# Patient Record
Sex: FEMALE | Race: OTHER | HISPANIC OR LATINO
[De-identification: names, ages, dates, MRNs, and addresses within clinical notes are randomized per-mention and may not be internally consistent; named-entity substitution may affect disease eponyms.]

---
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
## 2022-03-29 ENCOUNTER — APPOINTMENT (OUTPATIENT)
Dept: FAMILY MEDICINE | Facility: CLINIC | Age: 22
End: 2022-03-29
Payer: MEDICAID

## 2022-03-29 VITALS
RESPIRATION RATE: 16 BRPM | DIASTOLIC BLOOD PRESSURE: 80 MMHG | SYSTOLIC BLOOD PRESSURE: 120 MMHG | BODY MASS INDEX: 42.8 KG/M2 | HEIGHT: 60 IN | OXYGEN SATURATION: 98 % | WEIGHT: 218 LBS | TEMPERATURE: 98.7 F | HEART RATE: 125 BPM

## 2022-03-29 DIAGNOSIS — Z28.3 UNDERIMMUNIZATION STATUS: ICD-10-CM

## 2022-03-29 DIAGNOSIS — Z02.89 ENCOUNTER FOR OTHER ADMINISTRATIVE EXAMINATIONS: ICD-10-CM

## 2022-03-29 PROCEDURE — 99213 OFFICE O/P EST LOW 20 MIN: CPT

## 2022-03-29 RX ORDER — QUETIAPINE FUMARATE 50 MG/1
50 TABLET ORAL DAILY
Qty: 30 | Refills: 3 | Status: COMPLETED | COMMUNITY
Start: 2020-11-09 | End: 2022-03-29

## 2022-03-29 NOTE — PHYSICAL EXAM
[Normal] : normal gait, coordination grossly intact, no focal deficits and deep tendon reflexes were 2+ and symmetric [de-identified] : Hyperpigmented skin around neck at the corners of the mouth

## 2022-03-29 NOTE — HISTORY OF PRESENT ILLNESS
[FreeTextEntry8] : 22 y/o female with PMHx significant for anemia, anxiety and depression, obesity presenting to the office c/o darkening skin at the corners of her mouth and around her neck. Pt has not used any OTC creams or ointments.

## 2022-03-29 NOTE — HEALTH RISK ASSESSMENT
[Never] : Never [No] : In the past 12 months have you used drugs other than those required for medical reasons? No [No falls in past year] : Patient reported no falls in the past year [0] : 2) Feeling down, depressed, or hopeless: Not at all (0) [PHQ-2 Negative - No further assessment needed] : PHQ-2 Negative - No further assessment needed [Audit-CScore] : 0 [de-identified] : none [de-identified] : regular [QDC2Npffm] : 0

## 2022-03-29 NOTE — ASSESSMENT
[FreeTextEntry1] : 19 y/o female with PMHx significant for anemia, anxiety and depression, obesity presenting to the office c/o hyperpigmented skin\par \par DERMATOLOGY:: Hyperpigmented skin\par -Likely Acanthosis Nigricans\par -Dermatology referral\par -Weight loss recommended\par \par PSYCH: h/o anxiety and depression, c/o suicidal ideation and intention\par -Depression resolved since her trip to [facility].\par -Not on any medications.\par \par HEME: h/o Anemia\par -CBC improved, normalized\par -Continue Iron supplements\par \par GYN: Abnormal vaginal bleed\par -GYN Dr Stratton.\par -Off OCP\par \par HCM:\par -Flu vaccine 10/20\par -Schedule CPE at earliest convenience\par -Covid vaccine completed MODERNA.

## 2022-05-05 ENCOUNTER — LABORATORY RESULT (OUTPATIENT)
Age: 22
End: 2022-05-05

## 2022-05-05 ENCOUNTER — APPOINTMENT (OUTPATIENT)
Dept: FAMILY MEDICINE | Facility: CLINIC | Age: 22
End: 2022-05-05
Payer: MEDICAID

## 2022-05-05 VITALS
WEIGHT: 213 LBS | SYSTOLIC BLOOD PRESSURE: 122 MMHG | TEMPERATURE: 97.2 F | HEART RATE: 100 BPM | OXYGEN SATURATION: 98 % | HEIGHT: 60 IN | BODY MASS INDEX: 41.82 KG/M2 | DIASTOLIC BLOOD PRESSURE: 78 MMHG | RESPIRATION RATE: 16 BRPM

## 2022-05-05 DIAGNOSIS — Z00.00 ENCOUNTER FOR GENERAL ADULT MEDICAL EXAMINATION W/OUT ABNORMAL FINDINGS: ICD-10-CM

## 2022-05-05 PROCEDURE — 99395 PREV VISIT EST AGE 18-39: CPT | Mod: 25

## 2022-05-05 NOTE — PAST MEDICAL HISTORY
[Menstruating] : hx of menstruating [Menarche Age ____] : age at menarche was [unfilled] [Approximately ___ (Month)] : the LMP was approximately [unfilled] month(s) ago [Excessive Bleeding] : there was excessive bleeding

## 2022-05-09 NOTE — COUNSELING
[Fall prevention counseling provided] : Fall prevention counseling provided [Adequate lighting] : Adequate lighting [No throw rugs] : No throw rugs [Use proper foot wear] : Use proper foot wear [Behavioral health counseling provided] : Behavioral health counseling provided [Sleep ___ hours/day] : Sleep [unfilled] hours/day [Yes] : Risk of tobacco use and health benefits of smoking cessation discussed: Yes [AUDIT-C Screening administered and reviewed] : AUDIT-C Screening administered and reviewed [Potential consequences of obesity discussed] : Potential consequences of obesity discussed [Benefits of weight loss discussed] : Benefits of weight loss discussed [Encouraged to increase physical activity] : Encouraged to increase physical activity [____ min/wk Activity] : [unfilled] min/wk activity [Patient motivation] : Patient motivation

## 2022-05-09 NOTE — PHYSICAL EXAM
[No Acute Distress] : no acute distress [Well Nourished] : well nourished [Well Developed] : well developed [Well-Appearing] : well-appearing [Normal Sclera/Conjunctiva] : normal sclera/conjunctiva [PERRL] : pupils equal round and reactive to light [EOMI] : extraocular movements intact [Normal Outer Ear/Nose] : the outer ears and nose were normal in appearance [Normal Oropharynx] : the oropharynx was normal [No JVD] : no jugular venous distention [No Lymphadenopathy] : no lymphadenopathy [Supple] : supple [Thyroid Normal, No Nodules] : the thyroid was normal and there were no nodules present [No Respiratory Distress] : no respiratory distress  [No Accessory Muscle Use] : no accessory muscle use [Clear to Auscultation] : lungs were clear to auscultation bilaterally [Normal Rate] : normal rate  [Regular Rhythm] : with a regular rhythm [Normal S1, S2] : normal S1 and S2 [No Murmur] : no murmur heard [No Carotid Bruits] : no carotid bruits [No Abdominal Bruit] : a ~M bruit was not heard ~T in the abdomen [No Varicosities] : no varicosities [Pedal Pulses Present] : the pedal pulses are present [No Edema] : there was no peripheral edema [No Palpable Aorta] : no palpable aorta [No Extremity Clubbing/Cyanosis] : no extremity clubbing/cyanosis [Soft] : abdomen soft [Non Tender] : non-tender [Non-distended] : non-distended [No Masses] : no abdominal mass palpated [No HSM] : no HSM [Normal Bowel Sounds] : normal bowel sounds [Normal Posterior Cervical Nodes] : no posterior cervical lymphadenopathy [Normal Anterior Cervical Nodes] : no anterior cervical lymphadenopathy [No CVA Tenderness] : no CVA  tenderness [No Spinal Tenderness] : no spinal tenderness [No Joint Swelling] : no joint swelling [Grossly Normal Strength/Tone] : grossly normal strength/tone [No Rash] : no rash [Coordination Grossly Intact] : coordination grossly intact [No Focal Deficits] : no focal deficits [Normal Gait] : normal gait [Deep Tendon Reflexes (DTR)] : deep tendon reflexes were 2+ and symmetric [Normal Affect] : the affect was normal [Normal Insight/Judgement] : insight and judgment were intact [de-identified] : Obese

## 2022-05-09 NOTE — HEALTH RISK ASSESSMENT
[Good] : ~his/her~ current health as good [Fair] :  ~his/her~ mood as fair [Never] : Never [No] : No [Yes] : In the past 12 months have you used drugs other than those required for medical reasons? Yes [No falls in past year] : Patient reported no falls in the past year [0] : 2) Feeling down, depressed, or hopeless: Not at all (0) [PHQ-2 Negative - No further assessment needed] : PHQ-2 Negative - No further assessment needed [Patient reported PAP Smear was normal] : Patient reported PAP Smear was normal [HIV Test offered] : HIV Test offered [Hepatitis C test offered] : Hepatitis C test offered [None] : None [With Family] : lives with family [# of Members in Household ___] :  household currently consist of [unfilled] member(s) [Unemployed] : unemployed [Single] : single [Sexually Active] : sexually active [Feels Safe at Home] : Feels safe at home [Fully functional (bathing, dressing, toileting, transferring, walking, feeding)] : Fully functional (bathing, dressing, toileting, transferring, walking, feeding) [Fully functional (using the telephone, shopping, preparing meals, housekeeping, doing laundry, using] : Fully functional and needs no help or supervision to perform IADLs (using the telephone, shopping, preparing meals, housekeeping, doing laundry, using transportation, managing medications and managing finances) [Smoke Detector] : smoke detector [Carbon Monoxide Detector] : carbon monoxide detector [Seat Belt] :  uses seat belt [Sunscreen] : uses sunscreen [With Patient/Caregiver] : , with patient/caregiver [Aggressive treatment] : aggressive treatment [Intercurrent ED visits] : went to ED [de-identified] : SSUH secondary to suicidal ideations [Audit-CScore] : 0 [de-identified] : Marijuana 1 x a day [de-identified] : none [de-identified] : regular [[id number]] : 0 [Change in mental status noted] : No change in mental status noted [Language] : denies difficulty with language [Behavior] : denies difficulty with behavior [Learning/Retaining New Information] : denies difficulty learning/retaining new information [Handling Complex Tasks] : denies difficulty handling complex tasks [Reasoning] : denies difficulty with reasoning [Spatial Ability and Orientation] : denies difficulty with spatial ability and orientation [High Risk Behavior] : no high risk behavior [Reports changes in hearing] : Reports no changes in hearing [Reports changes in vision] : Reports no changes in vision [Reports changes in dental health] : Reports no changes in dental health [Guns at Home] : no guns at home [PapSmearDate] : 2021 [PapSmearComments] : Dr SAMUEL Stratton [AdvancecareDate] : 05/22

## 2022-05-09 NOTE — HISTORY OF PRESENT ILLNESS
[FreeTextEntry1] : CPE [de-identified] : 22 y/o female with PMHx significant for anemia, anxiety and depression, obesity presenting to the office for CPE. Pt states that she had an episode of cutting herself (suicidal intention) for which she herself went to the ER at [facility], discharged to home to follow up with psychotherapist.

## 2022-05-09 NOTE — ASSESSMENT
[FreeTextEntry1] : 21 y/o female with PMHx significant for anemia, anxiety and depression, obesity presenting to the office for CPE. Pt s/p suicidal ideations visit to ED, now doing well.\par \par PSYCH: h/o anxiety and depression, c/o suicidal ideation and intention after loosing her job\par -Last depression bout had been resolved since her trip to [facility].\par -Not on any medications.\par -Started Psychotherapist \par -Will be flying back to [facility] where she feels "much better"\par \par DERMATOLOGY:: Hyperpigmented skin\par -Likely Acanthosis Nigricans\par -Dermatology referral but has not made appointment yet\par -Weight loss recommended\par \par HEME: h/o Anemia\par -CBC improved, normalized\par -Continue Iron supplements\par \par GYN: Abnormal vaginal bleed\par -GYN Dr Stratton.\par -Off OCP\par \par HCM:\par -Blood work in house today\par -Flu vaccine 09/20\par -Covid vaccine completed MODERNA 7/21/21, 8/18/21.\par

## 2022-06-06 DIAGNOSIS — E55.9 VITAMIN D DEFICIENCY, UNSPECIFIED: ICD-10-CM

## 2022-06-06 LAB
25(OH)D3 SERPL-MCNC: 9.6 NG/ML
ALBUMIN SERPL ELPH-MCNC: 4.6 G/DL
ALP BLD-CCNC: 92 U/L
ALT SERPL-CCNC: 27 U/L
ANION GAP SERPL CALC-SCNC: 12 MMOL/L
AST SERPL-CCNC: 21 U/L
BASOPHILS # BLD AUTO: 0.06 K/UL
BASOPHILS NFR BLD AUTO: 0.6 %
BILIRUB SERPL-MCNC: 0.6 MG/DL
BUN SERPL-MCNC: 15 MG/DL
CALCIUM SERPL-MCNC: 9.9 MG/DL
CHLORIDE SERPL-SCNC: 106 MMOL/L
CHOLEST SERPL-MCNC: 160 MG/DL
CO2 SERPL-SCNC: 22 MMOL/L
CREAT SERPL-MCNC: 0.68 MG/DL
EGFR: 129 ML/MIN/1.73M2
EOSINOPHIL # BLD AUTO: 0.07 K/UL
EOSINOPHIL NFR BLD AUTO: 0.7 %
ESTIMATED AVERAGE GLUCOSE: 120 MG/DL
FERRITIN SERPL-MCNC: 5 NG/ML
GLUCOSE SERPL-MCNC: 99 MG/DL
HBA1C MFR BLD HPLC: 5.8 %
HCT VFR BLD CALC: 36.6 %
HDLC SERPL-MCNC: 49 MG/DL
HGB BLD-MCNC: 10.4 G/DL
IMM GRANULOCYTES NFR BLD AUTO: 0.3 %
IRON SATN MFR SERPL: 4 %
IRON SERPL-MCNC: 20 UG/DL
LDLC SERPL CALC-MCNC: 96 MG/DL
LYMPHOCYTES # BLD AUTO: 2.25 K/UL
LYMPHOCYTES NFR BLD AUTO: 23.7 %
MAN DIFF?: NORMAL
MCHC RBC-ENTMCNC: 19.1 PG
MCHC RBC-ENTMCNC: 28.4 GM/DL
MCV RBC AUTO: 67.2 FL
MONOCYTES # BLD AUTO: 0.57 K/UL
MONOCYTES NFR BLD AUTO: 6 %
NEUTROPHILS # BLD AUTO: 6.51 K/UL
NEUTROPHILS NFR BLD AUTO: 68.7 %
NONHDLC SERPL-MCNC: 111 MG/DL
PLATELET # BLD AUTO: 398 K/UL
POTASSIUM SERPL-SCNC: 4.3 MMOL/L
PROT SERPL-MCNC: 7.8 G/DL
RBC # BLD: 5.45 M/UL
RBC # FLD: 21.1 %
SODIUM SERPL-SCNC: 140 MMOL/L
TIBC SERPL-MCNC: 537 UG/DL
TRIGL SERPL-MCNC: 76 MG/DL
TSH SERPL-ACNC: 1.36 UIU/ML
UIBC SERPL-MCNC: 517 UG/DL
VIT B12 SERPL-MCNC: 655 PG/ML
WBC # FLD AUTO: 9.49 K/UL

## 2022-10-31 ENCOUNTER — APPOINTMENT (OUTPATIENT)
Dept: FAMILY MEDICINE | Facility: CLINIC | Age: 22
End: 2022-10-31

## 2022-10-31 VITALS
WEIGHT: 221 LBS | SYSTOLIC BLOOD PRESSURE: 122 MMHG | TEMPERATURE: 98 F | BODY MASS INDEX: 43.39 KG/M2 | HEIGHT: 60 IN | RESPIRATION RATE: 16 BRPM | HEART RATE: 89 BPM | DIASTOLIC BLOOD PRESSURE: 72 MMHG | OXYGEN SATURATION: 97 %

## 2022-10-31 DIAGNOSIS — M54.50 LOW BACK PAIN, UNSPECIFIED: ICD-10-CM

## 2022-10-31 PROCEDURE — 99214 OFFICE O/P EST MOD 30 MIN: CPT | Mod: 25

## 2022-10-31 PROCEDURE — 36415 COLL VENOUS BLD VENIPUNCTURE: CPT

## 2022-10-31 NOTE — REVIEW OF SYSTEMS
[Muscle Pain] : muscle pain [Back Pain] : back pain [Hair Changes] : hair changes [Negative] : Respiratory

## 2022-10-31 NOTE — ASSESSMENT
[FreeTextEntry1] : 21 y/o female with PMHx significant for anemia, anxiety and depression, obesity presenting to the office c/o loweer back pain and weight loss\par \par PSYCH: h/o anxiety and depression, c/o suicidal ideation and intention after loosing her job\par -Last depression bout had been resolved since her trip to [facility].\par -Not on any medications.\par -Started Psychotherapist \par -Feels her "family issues" actually brings her "down"\par \par MSK: Lower back pain\par -Weight loss\par -May continue OTC NSAIDs\par \par DERMATOLOGY:: Hyperpigmented skin, hair loss\par -Ketoconazole shampoo e-prescribed\par -Hyperpigmented skin likely Acanthosis Nigricans\par -Dermatology referral but has not made appointment yet\par -Weight loss recommended\par -Check TSH, CMP\par \par HEME: h/o Anemia\par -CBC improved, normalized\par -Continue Iron supplements\par -Check CBC\par \par GYN: Abnormal vaginal bleed\par -GYN Dr Stratton.\par -Off OCP\par \par GENERAL: Weight gain\par -Trial of Victoza daily\par \par HCM:\par -Blood work in house today\par -Flu vaccine 09/20\par -Covid vaccine completed MODERNA 7/21/21, 8/18/21.\par -Flu vaccine declined today.\par

## 2022-10-31 NOTE — HISTORY OF PRESENT ILLNESS
[FreeTextEntry1] : back pain and hair loss [de-identified] : 23 y/o female with PMHx significant for anemia, anxiety and depression, obesity presenting to the office c/o low back pain x 2 weeks now mostly resolved. Pt went to [facility], had Xrays that reportedly did not show any abnormality. Pain improving, pt also states that he has been loosing her hair more than normal amounts.

## 2022-10-31 NOTE — PHYSICAL EXAM
[Normal] : no joint swelling and grossly normal strength and tone [de-identified] : Tenderness to palpation over L3-L4 radiating more towards the right area

## 2022-10-31 NOTE — HEALTH RISK ASSESSMENT
[Intercurrent ED visits] : went to ED [Never] : Never [No] : No [Yes] : In the past 12 months have you used drugs other than those required for medical reasons? Yes [No falls in past year] : Patient reported no falls in the past year [0] : 2) Feeling down, depressed, or hopeless: Not at all (0) [PHQ-2 Negative - No further assessment needed] : PHQ-2 Negative - No further assessment needed [With Patient/Caregiver] : , with patient/caregiver [Aggressive treatment] : aggressive treatment [de-identified] : SSUH secondary to suicidal ideations [Audit-CScore] : 0 [de-identified] : Marijuana 1 x a day [de-identified] : none [de-identified] : regular [[id number]] : 0 [AdvancecareDate] : 05/22

## 2022-11-04 LAB
ALBUMIN SERPL ELPH-MCNC: 4.6 G/DL
ALP BLD-CCNC: 94 U/L
ALT SERPL-CCNC: 49 U/L
ANION GAP SERPL CALC-SCNC: 12 MMOL/L
AST SERPL-CCNC: 37 U/L
BASOPHILS # BLD AUTO: 0.05 K/UL
BASOPHILS NFR BLD AUTO: 0.5 %
BILIRUB SERPL-MCNC: 0.6 MG/DL
BUN SERPL-MCNC: 14 MG/DL
CALCIUM SERPL-MCNC: 9.6 MG/DL
CHLORIDE SERPL-SCNC: 105 MMOL/L
CO2 SERPL-SCNC: 23 MMOL/L
CREAT SERPL-MCNC: 1.03 MG/DL
EGFR: 80 ML/MIN/1.73M2
EOSINOPHIL # BLD AUTO: 0.1 K/UL
EOSINOPHIL NFR BLD AUTO: 1 %
GLUCOSE SERPL-MCNC: 106 MG/DL
HCT VFR BLD CALC: 33.5 %
HGB BLD-MCNC: 10.3 G/DL
IMM GRANULOCYTES NFR BLD AUTO: 0.3 %
LYMPHOCYTES # BLD AUTO: 2.86 K/UL
LYMPHOCYTES NFR BLD AUTO: 28.4 %
MAN DIFF?: NORMAL
MCHC RBC-ENTMCNC: 22.3 PG
MCHC RBC-ENTMCNC: 30.7 GM/DL
MCV RBC AUTO: 72.5 FL
MONOCYTES # BLD AUTO: 0.89 K/UL
MONOCYTES NFR BLD AUTO: 8.8 %
NEUTROPHILS # BLD AUTO: 6.14 K/UL
NEUTROPHILS NFR BLD AUTO: 61 %
PLATELET # BLD AUTO: 365 K/UL
POTASSIUM SERPL-SCNC: 4 MMOL/L
PROT SERPL-MCNC: 7.4 G/DL
RBC # BLD: 4.62 M/UL
RBC # FLD: 16.6 %
SODIUM SERPL-SCNC: 141 MMOL/L
TSH SERPL-ACNC: 1.95 UIU/ML
WBC # FLD AUTO: 10.07 K/UL

## 2022-11-17 ENCOUNTER — APPOINTMENT (OUTPATIENT)
Dept: FAMILY MEDICINE | Facility: CLINIC | Age: 22
End: 2022-11-17

## 2022-11-17 VITALS
OXYGEN SATURATION: 99 % | WEIGHT: 223 LBS | RESPIRATION RATE: 16 BRPM | DIASTOLIC BLOOD PRESSURE: 70 MMHG | SYSTOLIC BLOOD PRESSURE: 124 MMHG | TEMPERATURE: 97.2 F | HEIGHT: 60 IN | HEART RATE: 80 BPM | BODY MASS INDEX: 43.78 KG/M2

## 2022-11-17 PROCEDURE — 99214 OFFICE O/P EST MOD 30 MIN: CPT | Mod: 25

## 2022-11-17 PROCEDURE — 36415 COLL VENOUS BLD VENIPUNCTURE: CPT

## 2022-11-17 PROCEDURE — 90686 IIV4 VACC NO PRSV 0.5 ML IM: CPT

## 2022-11-17 PROCEDURE — G0008: CPT

## 2022-11-17 NOTE — REVIEW OF SYSTEMS
[Back Pain] : back pain [Hair Changes] : hair changes [Nausea] : nausea [Negative] : Integumentary [Abdominal Pain] : no abdominal pain [Vomiting] : no vomiting [FreeTextEntry1] : Hot Flushes

## 2022-11-17 NOTE — HEALTH RISK ASSESSMENT
[Intercurrent ED visits] : went to ED [Never] : Never [No] : No [Yes] : In the past 12 months have you used drugs other than those required for medical reasons? Yes [No falls in past year] : Patient reported no falls in the past year [0] : 2) Feeling down, depressed, or hopeless: Not at all (0) [PHQ-2 Negative - No further assessment needed] : PHQ-2 Negative - No further assessment needed [With Patient/Caregiver] : , with patient/caregiver [Aggressive treatment] : aggressive treatment [de-identified] : SSUH secondary to suicidal ideations [Audit-CScore] : 0 [de-identified] : Marijuana 1 x a day [de-identified] : none [de-identified] : regular [[id number]] : 0 [AdvancecareDate] : 05/22

## 2022-11-17 NOTE — ASSESSMENT
[FreeTextEntry1] : 21 y/o female with PMHx significant for anemia, anxiety and depression, obesity presenting to the office c/o nausea, hot flushes.\par \par GI: Nausea\par -Check CBC, CMP, Lipase, Amylase, C-Peptide, Hepatic panel\par -Pt states that had work up in the past for similar symptoms, never found reason for nausea\par -Asked pt to eat smaller meals during the day as nausea can be due to hypoglycemic state.\par \par PSYCH: h/o anxiety and depression, c/o suicidal ideation and intention after loosing her job\par -Not on any medications.\par -Started Psychotherapist, feels well\par -Feels her "family issues" actually brings her "down"\par \par MSK: Lower back pain\par -Weight loss\par -May continue OTC NSAIDs\par \par HEME: h/o Anemia\par -CBC improved, normalized\par -Continue Iron supplements\par -Check CBC again\par \par GYN: Abnormal vaginal bleed\par -GYN Dr Stratton.\par -Off OCP\par \par GENERAL: Weight gain\par -Trial of Victoza daily, will re-send to pharmacy\par \par HCM:\par -Blood work in house today\par -Flu vaccine administered in house today\par -Covid vaccine completed MODERNA 7/21/21, 8/18/21.\par

## 2022-11-17 NOTE — PHYSICAL EXAM
[Normal] : no joint swelling and grossly normal strength and tone [de-identified] : Tenderness to palpation over L3-L4 radiating more towards the right area

## 2022-11-17 NOTE — HISTORY OF PRESENT ILLNESS
[FreeTextEntry1] : back pain and hair loss [de-identified] : 23 y/o female with PMHx significant for anemia, anxiety and depression, obesity presenting to the office c/o nausea, denies vomiting, hot flushes.

## 2023-03-01 LAB
ALBUMIN SERPL ELPH-MCNC: 4.2 G/DL
ALP BLD-CCNC: 84 U/L
ALT SERPL-CCNC: 57 U/L
AMYLASE/CREAT SERPL: 81 U/L
ANION GAP SERPL CALC-SCNC: 16 MMOL/L
AST SERPL-CCNC: 36 U/L
BASOPHILS # BLD AUTO: 0.07 K/UL
BASOPHILS NFR BLD AUTO: 0.6 %
BILIRUB DIRECT SERPL-MCNC: 0.1 MG/DL
BILIRUB INDIRECT SERPL-MCNC: 0.4 MG/DL
BILIRUB SERPL-MCNC: 0.5 MG/DL
BUN SERPL-MCNC: 13 MG/DL
C PEPTIDE SERPL-MCNC: 9.3 NG/ML
CALCIUM SERPL-MCNC: 9.5 MG/DL
CHLORIDE SERPL-SCNC: 107 MMOL/L
CO2 SERPL-SCNC: 20 MMOL/L
CREAT SERPL-MCNC: 0.75 MG/DL
EGFR: 117 ML/MIN/1.73M2
EOSINOPHIL # BLD AUTO: 0.13 K/UL
EOSINOPHIL NFR BLD AUTO: 1 %
ESTIMATED AVERAGE GLUCOSE: 114 MG/DL
HBA1C MFR BLD HPLC: 5.6 %
HCT VFR BLD CALC: 35.7 %
HGB BLD-MCNC: 10.6 G/DL
IMM GRANULOCYTES NFR BLD AUTO: 0.4 %
LPL SERPL-CCNC: 35 U/L
LYMPHOCYTES # BLD AUTO: 2.38 K/UL
LYMPHOCYTES NFR BLD AUTO: 18.9 %
MAN DIFF?: NORMAL
MCHC RBC-ENTMCNC: 22.1 PG
MCHC RBC-ENTMCNC: 29.7 GM/DL
MCV RBC AUTO: 74.5 FL
MONOCYTES # BLD AUTO: 0.64 K/UL
MONOCYTES NFR BLD AUTO: 5.1 %
NEUTROPHILS # BLD AUTO: 9.29 K/UL
NEUTROPHILS NFR BLD AUTO: 74 %
PLATELET # BLD AUTO: 419 K/UL
POTASSIUM SERPL-SCNC: 4 MMOL/L
PROT SERPL-MCNC: 6.8 G/DL
RBC # BLD: 4.79 M/UL
RBC # FLD: 16.8 %
SODIUM SERPL-SCNC: 143 MMOL/L
WBC # FLD AUTO: 12.56 K/UL

## 2023-03-02 ENCOUNTER — APPOINTMENT (OUTPATIENT)
Dept: DERMATOLOGY | Facility: CLINIC | Age: 23
End: 2023-03-02

## 2023-06-09 ENCOUNTER — APPOINTMENT (OUTPATIENT)
Dept: DERMATOLOGY | Facility: CLINIC | Age: 23
End: 2023-06-09
Payer: MEDICAID

## 2023-06-09 PROCEDURE — 99204 OFFICE O/P NEW MOD 45 MIN: CPT

## 2023-08-02 ENCOUNTER — NON-APPOINTMENT (OUTPATIENT)
Age: 23
End: 2023-08-02

## 2023-08-08 ENCOUNTER — LABORATORY RESULT (OUTPATIENT)
Age: 23
End: 2023-08-08

## 2023-08-08 ENCOUNTER — APPOINTMENT (OUTPATIENT)
Dept: FAMILY MEDICINE | Facility: CLINIC | Age: 23
End: 2023-08-08
Payer: MEDICAID

## 2023-08-08 VITALS
OXYGEN SATURATION: 98 % | DIASTOLIC BLOOD PRESSURE: 70 MMHG | BODY MASS INDEX: 41.23 KG/M2 | RESPIRATION RATE: 16 BRPM | SYSTOLIC BLOOD PRESSURE: 122 MMHG | HEART RATE: 83 BPM | WEIGHT: 210 LBS | HEIGHT: 60 IN

## 2023-08-08 DIAGNOSIS — Z87.2 PERSONAL HISTORY OF DISEASES OF THE SKIN AND SUBCUTANEOUS TISSUE: ICD-10-CM

## 2023-08-08 DIAGNOSIS — Z87.898 PERSONAL HISTORY OF OTHER SPECIFIED CONDITIONS: ICD-10-CM

## 2023-08-08 DIAGNOSIS — E66.9 OBESITY, UNSPECIFIED: ICD-10-CM

## 2023-08-08 DIAGNOSIS — R23.2 FLUSHING: ICD-10-CM

## 2023-08-08 PROCEDURE — 99214 OFFICE O/P EST MOD 30 MIN: CPT | Mod: 25

## 2023-08-08 RX ORDER — LIRAGLUTIDE 6 MG/ML
18 INJECTION SUBCUTANEOUS
Qty: 1 | Refills: 3 | Status: COMPLETED | COMMUNITY
Start: 2022-10-31 | End: 2023-08-08

## 2023-08-08 NOTE — HISTORY OF PRESENT ILLNESS
[de-identified] : 23 y/o female with PMHx significant for anemia, anxiety and depression, obesity presenting to the office c/o low back pain x 2 weeks now mostly resolved. Pt went to [facility], had Xrays that reportedly did not show any abnormality. Pain improving, pt also states that he has been loosing her hair more than normal amounts. [FreeTextEntry8] : 21 y/o female with PMHx significant for anemia, anxiety and depression, obesity presenting to the office for follow up on anemia, states that her last period lasted a month and thinks that she may need Iron infusions. Depression has improved.

## 2023-08-08 NOTE — PHYSICAL EXAM
[Normal] : no carotid or abdominal bruits heard, no varicosities, pedal pulses are present, no peripheral edema, no extremity clubbing or cyanosis and no palpable aorta [Soft] : abdomen soft [Non Tender] : non-tender [Non-distended] : non-distended [de-identified] : obese

## 2023-08-08 NOTE — ASSESSMENT
[FreeTextEntry1] : 21 y/o female with PMHx significant for anemia, anxiety and depression, obesity presenting to the office for follow up on anemia and abnormal menses.  PSYCH: h/o anxiety and depression, c/o suicidal ideation and intention after losing her job -Last depression bout had been resolved since her trip to [facility]. -Not on any medications. -Started Psychotherapist  -Feels her anxiety and depression has improve greatly, no suicidal ideations / intentions  MSK: Lower back pain -Weight loss -May continue OTC NSAIDs  DERMATOLOGY: Hyperpigmented skin, hair loss -Ketoconazole shampoo e-prescribed -Hyperpigmented skin likely Acanthosis Nigricans -Dermatology Dr Gomez following  HEME: h/o Anemia -Continue Iron supplements -Check CBC -Check Iron, Folate, TIBC, Vitamin B-12  GYN: Abnormal vaginal bleed -GYN Dr Stratton. -May need to go back on OCP  GENERAL: Weight gain -Rx for Wegovy e-prescribed -Diet and exercise  HCM: -Blood work in house today -Flu vaccine 09/20 -Covid vaccine completed MODERNA 7/21/21, 8/18/21. -Flu vaccine declined

## 2023-08-08 NOTE — HEALTH RISK ASSESSMENT
[Intercurrent ED visits] : went to ED [No] : No [Yes] : In the past 12 months have you used drugs other than those required for medical reasons? Yes [No falls in past year] : Patient reported no falls in the past year [0] : 2) Feeling down, depressed, or hopeless: Not at all (0) [PHQ-2 Negative - No further assessment needed] : PHQ-2 Negative - No further assessment needed [With Patient/Caregiver] : , with patient/caregiver [Aggressive treatment] : aggressive treatment [de-identified] : SSUH secondary to suicidal ideations [Audit-CScore] : 0 [de-identified] : Marijuana 1 x a day [de-identified] : none [de-identified] : regular [[id number]] : 0 [Never] : Never [Reviewed no changes] : Reviewed, no changes [AdvancecareDate] : 08/23

## 2023-08-09 ENCOUNTER — RX CHANGE (OUTPATIENT)
Age: 23
End: 2023-08-09

## 2023-08-14 LAB
FOLATE SERPL-MCNC: 13.4 NG/ML
IRON SATN MFR SERPL: 4 %
IRON SERPL-MCNC: 24 UG/DL
TIBC SERPL-MCNC: 550 UG/DL
UIBC SERPL-MCNC: 526 UG/DL
VIT B12 SERPL-MCNC: >2000 PG/ML

## 2023-10-12 ENCOUNTER — NON-APPOINTMENT (OUTPATIENT)
Age: 23
End: 2023-10-12

## 2023-11-17 ENCOUNTER — APPOINTMENT (OUTPATIENT)
Dept: FAMILY MEDICINE | Facility: CLINIC | Age: 23
End: 2023-11-17
Payer: MEDICAID

## 2023-11-17 VITALS
RESPIRATION RATE: 16 BRPM | WEIGHT: 209 LBS | DIASTOLIC BLOOD PRESSURE: 82 MMHG | BODY MASS INDEX: 41.03 KG/M2 | HEIGHT: 60 IN | TEMPERATURE: 98.5 F | SYSTOLIC BLOOD PRESSURE: 136 MMHG | HEART RATE: 68 BPM | OXYGEN SATURATION: 98 %

## 2023-11-17 DIAGNOSIS — R45.851 SUICIDAL IDEATIONS: ICD-10-CM

## 2023-11-17 DIAGNOSIS — F41.9 ANXIETY DISORDER, UNSPECIFIED: ICD-10-CM

## 2023-11-17 DIAGNOSIS — E66.9 OBESITY, UNSPECIFIED: ICD-10-CM

## 2023-11-17 DIAGNOSIS — M54.9 DORSALGIA, UNSPECIFIED: ICD-10-CM

## 2023-11-17 DIAGNOSIS — N92.6 IRREGULAR MENSTRUATION, UNSPECIFIED: ICD-10-CM

## 2023-11-17 DIAGNOSIS — D64.9 ANEMIA, UNSPECIFIED: ICD-10-CM

## 2023-11-17 DIAGNOSIS — F32.A ANXIETY DISORDER, UNSPECIFIED: ICD-10-CM

## 2023-11-17 DIAGNOSIS — L65.9 NONSCARRING HAIR LOSS, UNSPECIFIED: ICD-10-CM

## 2023-11-17 DIAGNOSIS — Z23 ENCOUNTER FOR IMMUNIZATION: ICD-10-CM

## 2023-11-17 PROCEDURE — 90686 IIV4 VACC NO PRSV 0.5 ML IM: CPT

## 2023-11-17 PROCEDURE — G0008: CPT

## 2023-11-17 PROCEDURE — 99214 OFFICE O/P EST MOD 30 MIN: CPT | Mod: 25

## 2023-11-20 ENCOUNTER — APPOINTMENT (OUTPATIENT)
Dept: CARDIOLOGY | Facility: CLINIC | Age: 23
End: 2023-11-20
Payer: MEDICAID

## 2023-11-20 ENCOUNTER — NON-APPOINTMENT (OUTPATIENT)
Age: 23
End: 2023-11-20

## 2023-11-20 VITALS — OXYGEN SATURATION: 100 % | SYSTOLIC BLOOD PRESSURE: 108 MMHG | HEART RATE: 86 BPM | DIASTOLIC BLOOD PRESSURE: 74 MMHG

## 2023-11-20 VITALS — WEIGHT: 208 LBS

## 2023-11-20 DIAGNOSIS — Z01.810 ENCOUNTER FOR PREPROCEDURAL CARDIOVASCULAR EXAMINATION: ICD-10-CM

## 2023-11-20 PROCEDURE — 99203 OFFICE O/P NEW LOW 30 MIN: CPT | Mod: 25

## 2023-11-20 PROCEDURE — 93000 ELECTROCARDIOGRAM COMPLETE: CPT | Mod: NC

## 2023-11-20 RX ORDER — KETOCONAZOLE 20.5 MG/ML
2 SHAMPOO, SUSPENSION TOPICAL
Qty: 120 | Refills: 1 | Status: DISCONTINUED | COMMUNITY
Start: 2022-10-31 | End: 2023-11-20

## 2023-11-20 RX ORDER — SEMAGLUTIDE 0.25 MG/.5ML
0.25 INJECTION, SOLUTION SUBCUTANEOUS
Qty: 1 | Refills: 0 | Status: DISCONTINUED | COMMUNITY
Start: 2023-08-08 | End: 2023-11-20

## 2023-11-20 RX ORDER — ONDANSETRON 4 MG/1
4 TABLET, ORALLY DISINTEGRATING ORAL EVERY 6 HOURS
Qty: 15 | Refills: 1 | Status: DISCONTINUED | COMMUNITY
Start: 2022-11-17 | End: 2023-11-20

## 2023-11-20 RX ORDER — MUPIROCIN 20 MG/G
2 OINTMENT TOPICAL 3 TIMES DAILY
Qty: 1 | Refills: 0 | Status: DISCONTINUED | COMMUNITY
Start: 2019-03-01 | End: 2023-11-20

## 2023-11-20 RX ORDER — ERGOCALCIFEROL 1.25 MG/1
1.25 MG CAPSULE, LIQUID FILLED ORAL
Qty: 12 | Refills: 0 | Status: DISCONTINUED | COMMUNITY
Start: 2022-06-06 | End: 2023-11-20

## 2023-11-21 ENCOUNTER — APPOINTMENT (OUTPATIENT)
Dept: CARDIOLOGY | Facility: CLINIC | Age: 23
End: 2023-11-21
Payer: MEDICAID

## 2023-11-21 PROCEDURE — 93015 CV STRESS TEST SUPVJ I&R: CPT

## 2024-01-10 NOTE — ASSESSMENT
[FreeTextEntry1] : EKG 11/20/2023- NSR. Normal EKG Assessment: 1.  Preoperative cardiac risk assessment prior to bariatric surgery   Recommendations: 1.  Regular stress test.  Patient's risk assessment to be addressed after the regular stress test.  Follow-up as needed as long as the stress test is normal

## 2024-01-10 NOTE — HISTORY OF PRESENT ILLNESS
[FreeTextEntry1] : HPI: Patient is a 23-year-old female who presents for preoperative cardiac evaluation prior to bariatric surgery.  Patient denies any exertional chest pain dyspnea palpitations.  Patient is essentially has no complaints     PMH: No known medical problems except anemia  Social History: Non-smoker denies any alcohol or substance abuse  Family history: Noncontributory, father has diabetes

## 2024-01-10 NOTE — ADDENDUM
[FreeTextEntry1] : 1/10/2024 Pt had exercise stress test which showed no ischemic EKG changes. Per Dr. Flores she is optimized from a cardiac standpoint to proceed with bariatric surgery.

## 2024-01-19 ENCOUNTER — APPOINTMENT (OUTPATIENT)
Dept: FAMILY MEDICINE | Facility: CLINIC | Age: 24
End: 2024-01-19
Payer: MEDICAID

## 2024-01-19 VITALS
HEIGHT: 60 IN | DIASTOLIC BLOOD PRESSURE: 68 MMHG | RESPIRATION RATE: 16 BRPM | TEMPERATURE: 98.2 F | BODY MASS INDEX: 40.84 KG/M2 | WEIGHT: 208 LBS | OXYGEN SATURATION: 96 % | SYSTOLIC BLOOD PRESSURE: 120 MMHG | HEART RATE: 92 BPM

## 2024-01-19 DIAGNOSIS — Z01.818 ENCOUNTER FOR OTHER PREPROCEDURAL EXAMINATION: ICD-10-CM

## 2024-01-19 DIAGNOSIS — E66.01 MORBID (SEVERE) OBESITY DUE TO EXCESS CALORIES: ICD-10-CM

## 2024-01-19 PROCEDURE — 99214 OFFICE O/P EST MOD 30 MIN: CPT | Mod: 25

## 2024-01-19 NOTE — RESULTS/DATA
[] : results reviewed [de-identified] : Hgb 7.9, Hct 28.6 [de-identified] : Lungs clear [de-identified] : NSR @ 83 bpm [de-identified] : HCG negative A1C 5.3 Iron 15 Vit B12  > 2000 ABO Rh AB positive

## 2024-01-19 NOTE — PHYSICAL EXAM
[No Acute Distress] : no acute distress [Well Nourished] : well nourished [Well Developed] : well developed [Well-Appearing] : well-appearing [Normal Sclera/Conjunctiva] : normal sclera/conjunctiva [PERRL] : pupils equal round and reactive to light [EOMI] : extraocular movements intact [Normal Outer Ear/Nose] : the outer ears and nose were normal in appearance [Normal Oropharynx] : the oropharynx was normal [No JVD] : no jugular venous distention [No Lymphadenopathy] : no lymphadenopathy [Supple] : supple [Thyroid Normal, No Nodules] : the thyroid was normal and there were no nodules present [No Respiratory Distress] : no respiratory distress  [No Accessory Muscle Use] : no accessory muscle use [Clear to Auscultation] : lungs were clear to auscultation bilaterally [Normal Rate] : normal rate  [Regular Rhythm] : with a regular rhythm [Normal S1, S2] : normal S1 and S2 [No Murmur] : no murmur heard [No Carotid Bruits] : no carotid bruits [No Abdominal Bruit] : a ~M bruit was not heard ~T in the abdomen [No Varicosities] : no varicosities [Pedal Pulses Present] : the pedal pulses are present [No Edema] : there was no peripheral edema [No Palpable Aorta] : no palpable aorta [No Extremity Clubbing/Cyanosis] : no extremity clubbing/cyanosis [Soft] : abdomen soft [Non Tender] : non-tender [Non-distended] : non-distended [No Masses] : no abdominal mass palpated [No HSM] : no HSM [Normal Bowel Sounds] : normal bowel sounds [Normal Posterior Cervical Nodes] : no posterior cervical lymphadenopathy [Normal Anterior Cervical Nodes] : no anterior cervical lymphadenopathy [No CVA Tenderness] : no CVA  tenderness [No Spinal Tenderness] : no spinal tenderness [No Joint Swelling] : no joint swelling [Grossly Normal Strength/Tone] : grossly normal strength/tone [No Rash] : no rash [Coordination Grossly Intact] : coordination grossly intact [No Focal Deficits] : no focal deficits [Normal Gait] : normal gait [Deep Tendon Reflexes (DTR)] : deep tendon reflexes were 2+ and symmetric [Normal Affect] : the affect was normal [Normal Insight/Judgement] : insight and judgment were intact [de-identified] : obese

## 2024-01-19 NOTE — ASSESSMENT
[High Risk Surgery - Intraperitoneal, Intrathoracic or Supringuinal Vascular Procedures] : High Risk Surgery - Intraperitoneal, Intrathoracic or Supringuinal Vascular Procedures - Yes (1) [Ischemic Heart Disease] : Ischemic Heart Disease - No (0) [Congestive Heart Failure] : Congestive Heart Failure - No (0) [Prior Cerebrovascular Accident or TIA] : Prior Cerebrovascular Accident or TIA - No (0) [Creatinine >= 2mg/dL (1 Point)] : Creatinine >= 2mg/dL - No (0) [Insulin-dependent Diabetic (1 Point)] : Insulin-dependent Diabetic - No (0) [1] : 1 , RCRI Class: II, Risk of Post-Op Cardiac Complications: 6.0%, 95% CI for Risk Estimate: 4.9% - 7.4% [Patient Optimized for Surgery] : Patient optimized for surgery [No Further Testing Recommended] : no further testing recommended [As per surgery] : as per surgery [FreeTextEntry4] : Laboratory results reviewed; low Hgb secondary to heavy menses. EKG reviewed; no abnormalities found. Pt is medically cleared for proposed procedure. [FreeTextEntry7] : Avoid NSAIDs starting one week prior to surgery

## 2024-01-19 NOTE — HISTORY OF PRESENT ILLNESS
[No Pertinent Cardiac History] : no history of aortic stenosis, atrial fibrillation, coronary artery disease, recent myocardial infarction, or implantable device/pacemaker [No Pertinent Pulmonary History] : no history of asthma, COPD, sleep apnea, or smoking [No Adverse Anesthesia Reaction] : no adverse anesthesia reaction in self or family member [(Patient denies any chest pain, claudication, dyspnea on exertion, orthopnea, palpitations or syncope)] : Patient denies any chest pain, claudication, dyspnea on exertion, orthopnea, palpitations or syncope [Good (7-10 METs)] : Good (7-10 METs) [Chronic Anticoagulation] : no chronic anticoagulation [Chronic Kidney Disease] : no chronic kidney disease [Diabetes] : no diabetes [FreeTextEntry1] : GASTRIC SLEEVE [FreeTextEntry2] : 1/22/24 [FreeTextEntry3] : Dr Juaquin Marcos [FreeTextEntry7] : EST: 	 Cardiology at [facility] [address] [facility], [address] [place] [address] Phone: [phone] Fax: [phone]  Exercise Stress Test (Non Imaging) Report  Patient:      ALEXANDRU BELCHER                Study Date: 11/21/2023 MRN:          [id number]                 Birth Date/Age: 2000 Age: 23 years Access #:     [id number]                     Gender: F Order Loc:    064                                 Height: 152.4 cm/ 60.0 in Request Phys: CHECO MARTINEZ                   Weight: 94.35 kg/ 208.00 lb Procedure:    Exercise Stress Test              BSA/ BMI: 1.90 m/ 40.62 kg/m Indication:   Encounter for preprocedural  Admiss Status: cardiovascular examination - Z01.810  --------------------------------------------------------------------------------------------------------------------------------------------------------- Risk Factors: Obesity.  --------------------------------------------------------------------------------------------------------------------------------------------------------- Conclusions: 1. The patient underwent stress testing using the standard Peter protocol. _ The patient exercised for 6 min 0 sec. _ The peak heart rate was 179 bpm; 90 % of predicted maximal heart rate for this patient. _ The patient achieved 7.00 METS which is consistent with poor exercise capacity. 2. Baseline electrocardiogram: normal sinus rhythm at a rate of 93 bpm. 3. Stress electrocardiogram: No ischemic ST segment changes. 4. Arrhythmias: No arrhythmia associated with stress. 5. Patient achieved 7.00 METS, which is consistent with poor exercise capacity. 6. Exaggerated heart rate response. 7. Normal blood pressure response. 8. Chest pain prior to test: no chest pain. 9. The Duke Treadmill Score is 6.00, which is consistent with low risk (=5) for future cardiac events.  ---------------------------------------------------------------------------------------------------------------------------------------------------------  Stress Test Results: Protocol: Standard Peter METS Achieved: 7.00 Stage Reached: 2 Exercise Duration: 6 min and 0 sec. Heart Rate: Resting 93 bpm, Stress peak 179 bpm (90 % max predicted) HR Response: Exaggerated. Blood Pressure: Resting 112/76 mmHg, Stress max 144/88 mmHg BP Response: Normal. Exercise Capacity: Poor. Pretest Chest Pain: None. Duke Treadmill Score: low risk   +-------------+--------------+---------------+---------+-----------+    Treadmill     Treadmill    Treadmill Grade   Heart      Blood      Time       Speed (mph)                     Rate     Pressure                                                 (bpm)     (mmHg) +-------------+--------------+---------------+---------+-----------+    Baseline                        0.0%          93       `112/76 +-------------+--------------+---------------+---------+-----------+    3:00 min         1.7             10%          155      130/74 +-------------+--------------+---------------+---------+-----------+    6:00 min         2.5             12%          179      144/88 +-------------+--------------+---------------+---------+-----------+  Recovery 1:00 -------------- ---------------    160       min +-------------+--------------+---------------+---------+-----------+  Recovery 2:00 -------------- ---------------    127      144/86       min +-------------+--------------+---------------+---------+-----------+  Recovery 4:00 -------------- ---------------    115      134/84       min +-------------+--------------+---------------+---------+-----------+  Recovery 6:00 -------------- ---------------    108      130/84       min +-------------+--------------+---------------+---------+-----------+  ---------------------------------------------------------------------------------------------------------------------------------------------------------Electrocardiogram: Baseline electrocardiogram: Normal sinus rhythm at a rate of 93 bpm. Stress electrocardiogram: No ischemic ST segment changes. Arrhythmias: No arrhythmia associated with stress.   Heart rate and blood pressure: The heart rate response was exaggerated. The blood pressure response was normal.  Exercise Capacity: The patient achieved 7.00 METS, which is consistent with poor exercise capacity.  Interpreting Provider: Electronically signed on 11/22/2023 at 10:02:18 AM by Checo Martinez MD

## 2024-01-19 NOTE — PLAN
[FreeTextEntry1] :    Medical clearance discussed with and reviewed by supervising attending Dr Joselin Luna M.D.

## 2024-01-20 ENCOUNTER — TRANSCRIPTION ENCOUNTER (OUTPATIENT)
Age: 24
End: 2024-01-20

## 2024-02-01 LAB
HCT VFR BLD CALC: 27.7 %
HGB BLD-MCNC: 7.8 G/DL
MCHC RBC-ENTMCNC: 16.4 PG
MCHC RBC-ENTMCNC: 28.2 GM/DL
MCV RBC AUTO: 58.1 FL
PLATELET # BLD AUTO: 453 K/UL
RBC # BLD: 4.77 M/UL
RBC # FLD: 22.3 %
WBC # FLD AUTO: 5.99 K/UL

## 2024-05-20 ENCOUNTER — APPOINTMENT (OUTPATIENT)
Dept: CARDIOLOGY | Facility: CLINIC | Age: 24
End: 2024-05-20

## 2024-07-31 ENCOUNTER — APPOINTMENT (OUTPATIENT)
Dept: FAMILY MEDICINE | Facility: CLINIC | Age: 24
End: 2024-07-31

## 2024-09-09 ENCOUNTER — LABORATORY RESULT (OUTPATIENT)
Age: 24
End: 2024-09-09

## 2024-09-09 ENCOUNTER — APPOINTMENT (OUTPATIENT)
Dept: FAMILY MEDICINE | Facility: CLINIC | Age: 24
End: 2024-09-09
Payer: COMMERCIAL

## 2024-09-09 VITALS
DIASTOLIC BLOOD PRESSURE: 74 MMHG | SYSTOLIC BLOOD PRESSURE: 120 MMHG | WEIGHT: 153 LBS | BODY MASS INDEX: 30.04 KG/M2 | HEART RATE: 67 BPM | RESPIRATION RATE: 14 BRPM | TEMPERATURE: 98 F | OXYGEN SATURATION: 98 % | HEIGHT: 60 IN

## 2024-09-09 DIAGNOSIS — D64.9 ANEMIA, UNSPECIFIED: ICD-10-CM

## 2024-09-09 DIAGNOSIS — F32.A ANXIETY DISORDER, UNSPECIFIED: ICD-10-CM

## 2024-09-09 DIAGNOSIS — M25.50 PAIN IN UNSPECIFIED JOINT: ICD-10-CM

## 2024-09-09 DIAGNOSIS — E55.9 VITAMIN D DEFICIENCY, UNSPECIFIED: ICD-10-CM

## 2024-09-09 DIAGNOSIS — F41.9 ANXIETY DISORDER, UNSPECIFIED: ICD-10-CM

## 2024-09-09 DIAGNOSIS — D50.9 IRON DEFICIENCY ANEMIA, UNSPECIFIED: ICD-10-CM

## 2024-09-09 DIAGNOSIS — M54.9 DORSALGIA, UNSPECIFIED: ICD-10-CM

## 2024-09-09 PROCEDURE — G2211 COMPLEX E/M VISIT ADD ON: CPT

## 2024-09-09 PROCEDURE — 99214 OFFICE O/P EST MOD 30 MIN: CPT

## 2024-09-10 ENCOUNTER — APPOINTMENT (OUTPATIENT)
Dept: FAMILY MEDICINE | Facility: CLINIC | Age: 24
End: 2024-09-10

## 2024-09-10 DIAGNOSIS — Z12.4 ENCOUNTER FOR SCREENING FOR MALIGNANT NEOPLASM OF CERVIX: ICD-10-CM

## 2024-09-16 LAB
BASOPHILS # BLD AUTO: 0.04 K/UL
BASOPHILS NFR BLD AUTO: 0.5 %
EOSINOPHIL # BLD AUTO: 0.06 K/UL
EOSINOPHIL NFR BLD AUTO: 0.8 %
HCT VFR BLD CALC: 27.1 %
HGB BLD-MCNC: 7.3 G/DL
IMM GRANULOCYTES NFR BLD AUTO: 0.3 %
IRON SATN MFR SERPL: 2 %
IRON SERPL-MCNC: 12 UG/DL
LYMPHOCYTES # BLD AUTO: 2.24 K/UL
LYMPHOCYTES NFR BLD AUTO: 28.9 %
MAN DIFF?: NORMAL
MCHC RBC-ENTMCNC: 16.5 PG
MCHC RBC-ENTMCNC: 26.9 GM/DL
MCV RBC AUTO: 61.2 FL
MONOCYTES # BLD AUTO: 0.56 K/UL
MONOCYTES NFR BLD AUTO: 7.2 %
NEUTROPHILS # BLD AUTO: 4.84 K/UL
NEUTROPHILS NFR BLD AUTO: 62.3 %
PLATELET # BLD AUTO: 363 K/UL
RBC # BLD: 4.43 M/UL
RBC # FLD: 21.8 %
TIBC SERPL-MCNC: 502 UG/DL
UIBC SERPL-MCNC: 490 UG/DL
VIT B12 SERPL-MCNC: 1018 PG/ML
WBC # FLD AUTO: 7.76 K/UL

## 2024-09-17 PROBLEM — D50.9 IRON DEFICIENCY ANEMIA, UNSPECIFIED: Status: ACTIVE | Noted: 2024-09-16

## 2024-09-17 NOTE — HISTORY OF PRESENT ILLNESS
[FreeTextEntry1] : follow up on anemia.  [de-identified] : 23 y/o female with PMHx significant for anemia, anxiety and depression, obesity presenting to the office for follow up on anemia.

## 2024-09-17 NOTE — ASSESSMENT
[FreeTextEntry1] : 19 y/o female with PMHx significant for anemia, anxiety and depression, obesity presenting to the office for follow up on anemia.  PSYCH: h/o anxiety and depression, c/o suicidal ideation and intention after losing her job -Not on any medications. -Continue Psychotherapist  -Feels her anxiety and depression has improve greatly, no suicidal ideations / intentions  MSK: Lower back pain -Improved after losing weight -Continue OTC NSAIDs  DERMATOLOGY: Hyperpigmented skin, hair loss -Hyperpigmented skin likely Acanthosis Nigricans -Dermatology Dr Gomez following  HEME: h/o Anemia -Has not been taking Iron supplements -Check CBC -Check Iron, Folate, TIBC, Vitamin B-12  GYN: h/o Abnormal vaginal bleed -GYN Dr Stratton. -May need to go back on OCP  GENERAL: Weight gain -Has lost 55lbs since last visit  HCM: -Blood work in house today -Flu vaccine 09/20 -Covid vaccine completed MODERNA 7/21/21, 8/18/21. -Flu vaccine declined

## 2024-09-17 NOTE — HEALTH RISK ASSESSMENT
[Intercurrent ED visits] : went to ED [No] : No [Yes] : In the past 12 months have you used drugs other than those required for medical reasons? Yes [No falls in past year] : Patient reported no falls in the past year [0] : 2) Feeling down, depressed, or hopeless: Not at all (0) [PHQ-2 Negative - No further assessment needed] : PHQ-2 Negative - No further assessment needed [With Patient/Caregiver] : , with patient/caregiver [Reviewed no changes] : Reviewed, no changes [Aggressive treatment] : aggressive treatment [Never] : Never [1 or 2 (0 pts)] : 1 or 2 (0 points) [Never (0 pts)] : Never (0 points) [Audit-CScore] : 0 [de-identified] : SSUH secondary to suicidal ideations [de-identified] : Marijuana 1 x a day [de-identified] : none [de-identified] : regular [[id number]] : 0 [AdvancecareDate] : 09/24

## 2024-12-26 ENCOUNTER — NON-APPOINTMENT (OUTPATIENT)
Age: 24
End: 2024-12-26

## 2024-12-28 ENCOUNTER — LABORATORY RESULT (OUTPATIENT)
Age: 24
End: 2024-12-28

## 2024-12-31 ENCOUNTER — NON-APPOINTMENT (OUTPATIENT)
Age: 24
End: 2024-12-31

## 2024-12-31 LAB
BASOPHILS # BLD AUTO: 0.08 K/UL
BASOPHILS NFR BLD AUTO: 1.8 %
EOSINOPHIL # BLD AUTO: 0 K/UL
EOSINOPHIL NFR BLD AUTO: 0 %
FERRITIN SERPL-MCNC: 3 NG/ML
FOLATE SERPL-MCNC: 5.7 NG/ML
HCT VFR BLD CALC: 23.3 %
HGB BLD-MCNC: 6.3 G/DL
IRON SATN MFR SERPL: 3 %
IRON SERPL-MCNC: 14 UG/DL
LYMPHOCYTES # BLD AUTO: 1.79 K/UL
LYMPHOCYTES NFR BLD AUTO: 40.4 %
MAN DIFF?: NORMAL
MCHC RBC-ENTMCNC: 14.4 PG
MCHC RBC-ENTMCNC: 27 G/DL
MCV RBC AUTO: 53.2 FL
MONOCYTES # BLD AUTO: 0.2 K/UL
MONOCYTES NFR BLD AUTO: 4.6 %
NEUTROPHILS # BLD AUTO: 2.35 K/UL
NEUTROPHILS NFR BLD AUTO: 53.2 %
PLATELET # BLD AUTO: 413 K/UL
RBC # BLD: 4.38 M/UL
RBC # FLD: 22.9 %
TIBC SERPL-MCNC: 493 UG/DL
UIBC SERPL-MCNC: 480 UG/DL
VIT B12 SERPL-MCNC: 799 PG/ML
WBC # FLD AUTO: 4.42 K/UL

## 2025-01-02 ENCOUNTER — NON-APPOINTMENT (OUTPATIENT)
Age: 25
End: 2025-01-02

## 2025-02-03 ENCOUNTER — APPOINTMENT (OUTPATIENT)
Dept: FAMILY MEDICINE | Facility: CLINIC | Age: 25
End: 2025-02-03
Payer: COMMERCIAL

## 2025-02-03 ENCOUNTER — LABORATORY RESULT (OUTPATIENT)
Age: 25
End: 2025-02-03

## 2025-02-03 VITALS
SYSTOLIC BLOOD PRESSURE: 124 MMHG | OXYGEN SATURATION: 98 % | HEIGHT: 60 IN | RESPIRATION RATE: 14 BRPM | HEART RATE: 70 BPM | DIASTOLIC BLOOD PRESSURE: 70 MMHG | WEIGHT: 150 LBS | BODY MASS INDEX: 29.45 KG/M2

## 2025-02-03 DIAGNOSIS — F32.A ANXIETY DISORDER, UNSPECIFIED: ICD-10-CM

## 2025-02-03 DIAGNOSIS — E55.9 VITAMIN D DEFICIENCY, UNSPECIFIED: ICD-10-CM

## 2025-02-03 DIAGNOSIS — F41.9 ANXIETY DISORDER, UNSPECIFIED: ICD-10-CM

## 2025-02-03 DIAGNOSIS — R59.0 LOCALIZED ENLARGED LYMPH NODES: ICD-10-CM

## 2025-02-03 DIAGNOSIS — D50.9 IRON DEFICIENCY ANEMIA, UNSPECIFIED: ICD-10-CM

## 2025-02-03 DIAGNOSIS — D64.9 ANEMIA, UNSPECIFIED: ICD-10-CM

## 2025-02-03 PROCEDURE — G2211 COMPLEX E/M VISIT ADD ON: CPT

## 2025-02-03 PROCEDURE — 99214 OFFICE O/P EST MOD 30 MIN: CPT

## 2025-02-07 LAB
BASOPHILS # BLD AUTO: 0.05 K/UL
BASOPHILS NFR BLD AUTO: 0.8 %
EOSINOPHIL # BLD AUTO: 0.07 K/UL
EOSINOPHIL NFR BLD AUTO: 1.1 %
ERYTHROCYTE [SEDIMENTATION RATE] IN BLOOD BY WESTERGREN METHOD: 20 MM/HR
HCT VFR BLD CALC: 39.7 %
HGB BLD-MCNC: 11.4 G/DL
IMM GRANULOCYTES NFR BLD AUTO: 0.2 %
LYMPHOCYTES # BLD AUTO: 2.65 K/UL
LYMPHOCYTES NFR BLD AUTO: 40.3 %
MAN DIFF?: NORMAL
MCHC RBC-ENTMCNC: 19.6 PG
MCHC RBC-ENTMCNC: 28.7 G/DL
MCV RBC AUTO: 68.1 FL
MONOCYTES # BLD AUTO: 0.4 K/UL
MONOCYTES NFR BLD AUTO: 6.1 %
NEUTROPHILS # BLD AUTO: 3.39 K/UL
NEUTROPHILS NFR BLD AUTO: 51.5 %
PLATELET # BLD AUTO: 316 K/UL
RBC # BLD: 5.83 M/UL
RBC # FLD: NORMAL
WBC # FLD AUTO: 6.57 K/UL

## 2025-03-24 ENCOUNTER — APPOINTMENT (OUTPATIENT)
Dept: FAMILY MEDICINE | Facility: CLINIC | Age: 25
End: 2025-03-24
Payer: MEDICAID

## 2025-03-24 VITALS
BODY MASS INDEX: 30.63 KG/M2 | HEIGHT: 60 IN | RESPIRATION RATE: 16 BRPM | HEART RATE: 76 BPM | WEIGHT: 156 LBS | TEMPERATURE: 98.1 F | OXYGEN SATURATION: 98 % | DIASTOLIC BLOOD PRESSURE: 70 MMHG | SYSTOLIC BLOOD PRESSURE: 122 MMHG

## 2025-03-24 DIAGNOSIS — Z00.00 ENCOUNTER FOR GENERAL ADULT MEDICAL EXAMINATION W/OUT ABNORMAL FINDINGS: ICD-10-CM

## 2025-03-24 DIAGNOSIS — R22.2 LOCALIZED SWELLING, MASS AND LUMP, TRUNK: ICD-10-CM

## 2025-03-24 DIAGNOSIS — Z13.1 ENCOUNTER FOR SCREENING FOR DIABETES MELLITUS: ICD-10-CM

## 2025-03-24 PROCEDURE — 99395 PREV VISIT EST AGE 18-39: CPT

## 2025-03-28 ENCOUNTER — APPOINTMENT (OUTPATIENT)
Dept: ORTHOPEDIC SURGERY | Facility: CLINIC | Age: 25
End: 2025-03-28
Payer: MEDICAID

## 2025-03-28 VITALS
DIASTOLIC BLOOD PRESSURE: 85 MMHG | WEIGHT: 152 LBS | BODY MASS INDEX: 29.84 KG/M2 | HEART RATE: 71 BPM | HEIGHT: 60 IN | SYSTOLIC BLOOD PRESSURE: 119 MMHG

## 2025-03-28 DIAGNOSIS — D17.1 BENIGN LIPOMATOUS NEOPLASM OF SKIN AND SUBCUTANEOUS TISSUE OF TRUNK: ICD-10-CM

## 2025-03-28 PROCEDURE — 99204 OFFICE O/P NEW MOD 45 MIN: CPT

## 2025-03-28 PROCEDURE — 72070 X-RAY EXAM THORAC SPINE 2VWS: CPT

## 2025-03-31 ENCOUNTER — OUTPATIENT (OUTPATIENT)
Dept: OUTPATIENT SERVICES | Facility: HOSPITAL | Age: 25
LOS: 1 days | End: 2025-03-31

## 2025-03-31 ENCOUNTER — APPOINTMENT (OUTPATIENT)
Dept: MRI IMAGING | Facility: CLINIC | Age: 25
End: 2025-03-31
Payer: MEDICAID

## 2025-03-31 DIAGNOSIS — D17.1 BENIGN LIPOMATOUS NEOPLASM OF SKIN AND SUBCUTANEOUS TISSUE OF TRUNK: ICD-10-CM

## 2025-03-31 PROCEDURE — 71550 MRI CHEST W/O DYE: CPT | Mod: 26

## 2025-04-01 ENCOUNTER — OUTPATIENT (OUTPATIENT)
Dept: OUTPATIENT SERVICES | Facility: HOSPITAL | Age: 25
LOS: 1 days | End: 2025-04-01
Payer: MEDICAID

## 2025-04-01 VITALS
WEIGHT: 158.73 LBS | RESPIRATION RATE: 18 BRPM | OXYGEN SATURATION: 99 % | HEART RATE: 78 BPM | SYSTOLIC BLOOD PRESSURE: 110 MMHG | HEIGHT: 61 IN | DIASTOLIC BLOOD PRESSURE: 62 MMHG | TEMPERATURE: 97 F

## 2025-04-01 DIAGNOSIS — Z01.818 ENCOUNTER FOR OTHER PREPROCEDURAL EXAMINATION: ICD-10-CM

## 2025-04-01 DIAGNOSIS — D17.30 BENIGN LIPOMATOUS NEOPLASM OF SKIN AND SUBCUTANEOUS TISSUE OF UNSPECIFIED SITES: ICD-10-CM

## 2025-04-01 DIAGNOSIS — D64.9 ANEMIA, UNSPECIFIED: ICD-10-CM

## 2025-04-01 DIAGNOSIS — Z29.9 ENCOUNTER FOR PROPHYLACTIC MEASURES, UNSPECIFIED: ICD-10-CM

## 2025-04-01 DIAGNOSIS — D17.1 BENIGN LIPOMATOUS NEOPLASM OF SKIN AND SUBCUTANEOUS TISSUE OF TRUNK: ICD-10-CM

## 2025-04-01 DIAGNOSIS — D17.9 BENIGN LIPOMATOUS NEOPLASM, UNSPECIFIED: ICD-10-CM

## 2025-04-01 DIAGNOSIS — Z90.3 ACQUIRED ABSENCE OF STOMACH [PART OF]: Chronic | ICD-10-CM

## 2025-04-01 DIAGNOSIS — Z13.89 ENCOUNTER FOR SCREENING FOR OTHER DISORDER: ICD-10-CM

## 2025-04-01 LAB
A1C WITH ESTIMATED AVERAGE GLUCOSE RESULT: 4.9 % — SIGNIFICANT CHANGE UP (ref 4–5.6)
ANION GAP SERPL CALC-SCNC: 18 MMOL/L — HIGH (ref 5–17)
BLD GP AB SCN SERPL QL: SIGNIFICANT CHANGE UP
BUN SERPL-MCNC: 12.5 MG/DL — SIGNIFICANT CHANGE UP (ref 8–20)
CALCIUM SERPL-MCNC: 9.4 MG/DL — SIGNIFICANT CHANGE UP (ref 8.4–10.5)
CHLORIDE SERPL-SCNC: 102 MMOL/L — SIGNIFICANT CHANGE UP (ref 96–108)
CO2 SERPL-SCNC: 19 MMOL/L — LOW (ref 22–29)
COMMENT - BLOOD BANK: SIGNIFICANT CHANGE UP
CREAT SERPL-MCNC: 0.52 MG/DL — SIGNIFICANT CHANGE UP (ref 0.5–1.3)
EGFR: 135 ML/MIN/1.73M2 — SIGNIFICANT CHANGE UP
EGFR: 135 ML/MIN/1.73M2 — SIGNIFICANT CHANGE UP
ESTIMATED AVERAGE GLUCOSE: 94 MG/DL — SIGNIFICANT CHANGE UP (ref 68–114)
GLUCOSE SERPL-MCNC: 71 MG/DL — SIGNIFICANT CHANGE UP (ref 70–99)
HCG SERPL-ACNC: 85.2 MIU/ML — HIGH
HCT VFR BLD CALC: 42 % — SIGNIFICANT CHANGE UP (ref 34.5–45)
HGB BLD-MCNC: 13.3 G/DL — SIGNIFICANT CHANGE UP (ref 11.5–15.5)
MCHC RBC-ENTMCNC: 25.1 PG — LOW (ref 27–34)
MCHC RBC-ENTMCNC: 31.7 G/DL — LOW (ref 32–36)
MCV RBC AUTO: 79.4 FL — LOW (ref 80–100)
NRBC # BLD AUTO: 0 K/UL — SIGNIFICANT CHANGE UP (ref 0–0)
NRBC # FLD: 0 K/UL — SIGNIFICANT CHANGE UP (ref 0–0)
PLATELET # BLD AUTO: 299 K/UL — SIGNIFICANT CHANGE UP (ref 150–400)
PMV BLD: SIGNIFICANT CHANGE UP FL (ref 7–13)
POTASSIUM SERPL-MCNC: 4 MMOL/L — SIGNIFICANT CHANGE UP (ref 3.5–5.3)
POTASSIUM SERPL-SCNC: 4 MMOL/L — SIGNIFICANT CHANGE UP (ref 3.5–5.3)
RBC # BLD: 5.29 M/UL — HIGH (ref 3.8–5.2)
RBC # FLD: 21 % — HIGH (ref 10.3–14.5)
SODIUM SERPL-SCNC: 139 MMOL/L — SIGNIFICANT CHANGE UP (ref 135–145)
WBC # BLD: 6.92 K/UL — SIGNIFICANT CHANGE UP (ref 3.8–10.5)
WBC # FLD AUTO: 6.92 K/UL — SIGNIFICANT CHANGE UP (ref 3.8–10.5)

## 2025-04-01 PROCEDURE — 84702 CHORIONIC GONADOTROPIN TEST: CPT

## 2025-04-01 PROCEDURE — 85027 COMPLETE CBC AUTOMATED: CPT

## 2025-04-01 PROCEDURE — 86900 BLOOD TYPING SEROLOGIC ABO: CPT

## 2025-04-01 PROCEDURE — G0463: CPT

## 2025-04-01 PROCEDURE — 80048 BASIC METABOLIC PNL TOTAL CA: CPT

## 2025-04-01 PROCEDURE — 86850 RBC ANTIBODY SCREEN: CPT

## 2025-04-01 PROCEDURE — 36415 COLL VENOUS BLD VENIPUNCTURE: CPT

## 2025-04-01 PROCEDURE — 83036 HEMOGLOBIN GLYCOSYLATED A1C: CPT

## 2025-04-01 PROCEDURE — 86901 BLOOD TYPING SEROLOGIC RH(D): CPT

## 2025-04-01 NOTE — H&P PST ADULT - ASSESSMENT
Patient educated on surgical scrub, preadmission instructions, medical clearance and day of procedure medications, pt. verbalizes understanding and agreement. Patient told to stop all vitamins, supplements, NSAIDs starting 5 days prior to Procedure. ERP reviewed. Pt. to have medical optimization with Lobo Bajwa and pt. verbalized agreement and understanding.     CAPRINI SCORE    AGE RELATED RISK FACTORS                                                             [ ] Age 41-60 years                                            (1 Point)  [ ] Age: 61-74 years                                           (2 Points)                 [ ] Age= 75 years                                                (3 Points)             DISEASE RELATED RISK FACTORS                                                       [ ] Edema in the lower extremities                 (1 Point)                     [ ] Varicose veins                                               (1 Point)                                 [ ] BMI > 25 Kg/m2                                            (1 Point)                                  [ ] Serious infection (ie PNA)                            (1 Point)                     [ ] Lung disease ( COPD, Emphysema)            (1 Point)                                                                          [ ] Acute myocardial infarction                         (1 Point)                  [ ] Congestive heart failure (in the previous month)  (1 Point)         [ ] Inflammatory bowel disease                            (1 Point)                  [ ] Central venous access, PICC or Port               (2 points)       (within the last month)                                                                [ ] Stroke (in the previous month)                        (5 Points)    [ ] Previous or present malignancy                       (2 points)                                                                                                                                                         HEMATOLOGY RELATED FACTORS                                                         [ ] Prior episodes of VTE                                     (3 Points)                     [ ] Positive family history for VTE                      (3 Points)                  [ ] Prothrombin 34432 A                                     (3 Points)                     [ ] Factor V Leiden                                                (3 Points)                        [ ] Lupus anticoagulants                                      (3 Points)                                                           [ ] Anticardiolipin antibodies                              (3 Points)                                                       [ ] High homocysteine in the blood                   (3 Points)                                             [ ] Other congenital or acquired thrombophilia      (3 Points)                                                [ ] Heparin induced thrombocytopenia                  (3 Points)                                        MOBILITY RELATED FACTORS  [ ] Bed rest                                                         (1 Point)  [ ] Plaster cast                                                    (2 points)  [ ] Bed bound for more than 72 hours           (2 Points)    GENDER SPECIFIC FACTORS  [ ] Pregnancy or had a baby within the last month   (1 Point)  [ ] Post-partum < 6 weeks                                   (1 Point)  [ ] Hormonal therapy  or oral contraception   (1 Point)  [ ] History of pregnancy complications              (1 point)  [ ] Unexplained or recurrent              (1 Point)    OTHER RISK FACTORS                                           (1 Point)  [ ] BMI >40, smoking, diabetes requiring insulin, chemotherapy  blood transfusions and length of surgery over 2 hours    SURGERY RELATED RISK FACTORS  [ ]  Section within the last month     (1 Point)  [ ] Minor surgery                                                  (1 Point)  [ ] Arthroscopic surgery                                       (2 Points)  x[ ] Planned major surgery lasting more            (2 Points)      than 45 minutes     [ ] Elective hip or knee joint replacement       (5 points)       surgery                                                TRAUMA RELATED RISK FACTORS  [ ] Fracture of the hip, pelvis, or leg                       (5 Points)  [ ] Spinal cord injury resulting in paralysis             (5 points)       (in the previous month)    [ ] Paralysis  (less than 1 month)                             (5 Points)  [ ] Multiple Trauma within 1 month                        (5 Points)    Total Score [    2    ]    Caprini Score 0-2: Low Risk, NO VTE prophylaxis required for most patients, encourage ambulation  Caprini Score 3-6: Moderate Risk , pharmacologic VTE prophylaxis is indicated for most patients (in the absence of contraindications)  Caprini Score Greater than or =7: High risk, pharmocologic VTE prophylaxis indicated for most patients (in the absence of contraindications)    OPIOID RISK TOOL    MEDINA EACH BOX THAT APPLIES AND ADD TOTALS AT THE END    FAMILY HISTORY OF SUBSTANCE ABUSE                 FEMALE         MALE                                                Alcohol                             [  ]1 pt          [  ]3pts                                               Illegal Durgs                     [  ]2 pts        [  ]3pts                                               Rx Drugs                           [  ]4 pts        [  ]4 pts    PERSONAL HISTORY OF SUBSTANCE ABUSE                                                                                          Alcohol                             [  ]3 pts       [  ]3 pts                                               Illegal Drugs                     [  ]4 pts        [  ]4 pts                                               Rx Drugs                           [  ]5 pts        [  ]5 pts    AGE BETWEEN 16-45 YEARS                                      [x  ]1 pt         [  ]1 pt    HISTORY OF PREADOLESCENT   SEXUAL ABUSE                                                             [  ]3 pts        [  ]0pts    PSYCHOLOGICAL DISEASE                     ADD, OCD, Bipolar, Schizophrenia        [  ]2 pts         [  ]2 pts                      Depression                                               [  ]1 pt           [  ]1 pt           SCORING TOTAL   (add numbers and type here)              (*1**)                                     A score of 3 or lower indicated LOW risk for future opioid abuse  A score of 4 to 7 indicated moderate risk for future opioid abuse  A score of 8 or higher indicates a high risk for opioid abuse   24-year-old female presents for PST with PMH of anemia requiring blood transfusions in past last transfusion 2025. Patient c/o discomfort under her right scapula. She is right-hand dominant. She states the lesion appeared spontaneously a few years ago and has gotten bigger. Her primary care provider recommended that she see Dr. Ivan for excision because she is having daily constant pain at that area which is exacerbated when she is particularly active. She is becoming an  and noticed that positioning is particularly painful for her. Before her career becomes busy and because of her pain levels which is constant, she would like this growing mass excised at this time. She is not having any tingling or numbness of the arms or legs, but there is a sharp pain which is intermittent rates 2-6 out of 10 about the lesion.There is no change in bowel or bladder. She does home exercise and stretching on a daily basis as directed by primary care provider for the last 6 months particularly for neck back and upper extremity pain without any resolution of this pain. Patient scheduled for removal of lipoma right torso on 2025 with Dr. Ivan.   Patient educated on surgical scrub, preadmission instructions, medical clearance and day of procedure medications, pt. verbalizes understanding and agreement. Patient told to stop all vitamins, supplements, NSAIDs starting 5 days prior to Procedure. ERP reviewed. Pt. to have medical optimization with Lobo Bajwa and pt. verbalized agreement and understanding.     CAPRINI SCORE    AGE RELATED RISK FACTORS                                                             [ ] Age 41-60 years                                            (1 Point)  [ ] Age: 61-74 years                                           (2 Points)                 [ ] Age= 75 years                                                (3 Points)             DISEASE RELATED RISK FACTORS                                                       [ ] Edema in the lower extremities                 (1 Point)                     [ ] Varicose veins                                               (1 Point)                                 [ ] BMI > 25 Kg/m2                                            (1 Point)                                  [ ] Serious infection (ie PNA)                            (1 Point)                     [ ] Lung disease ( COPD, Emphysema)            (1 Point)                                                                          [ ] Acute myocardial infarction                         (1 Point)                  [ ] Congestive heart failure (in the previous month)  (1 Point)         [ ] Inflammatory bowel disease                            (1 Point)                  [ ] Central venous access, PICC or Port               (2 points)       (within the last month)                                                                [ ] Stroke (in the previous month)                        (5 Points)    [ ] Previous or present malignancy                       (2 points)                                                                                                                                                         HEMATOLOGY RELATED FACTORS                                                         [ ] Prior episodes of VTE                                     (3 Points)                     [ ] Positive family history for VTE                      (3 Points)                  [ ] Prothrombin 52211 A                                     (3 Points)                     [ ] Factor V Leiden                                                (3 Points)                        [ ] Lupus anticoagulants                                      (3 Points)                                                           [ ] Anticardiolipin antibodies                              (3 Points)                                                       [ ] High homocysteine in the blood                   (3 Points)                                             [ ] Other congenital or acquired thrombophilia      (3 Points)                                                [ ] Heparin induced thrombocytopenia                  (3 Points)                                        MOBILITY RELATED FACTORS  [ ] Bed rest                                                         (1 Point)  [ ] Plaster cast                                                    (2 points)  [ ] Bed bound for more than 72 hours           (2 Points)    GENDER SPECIFIC FACTORS  [ ] Pregnancy or had a baby within the last month   (1 Point)  [ ] Post-partum < 6 weeks                                   (1 Point)  [ ] Hormonal therapy  or oral contraception   (1 Point)  [ ] History of pregnancy complications              (1 point)  [ ] Unexplained or recurrent              (1 Point)    OTHER RISK FACTORS                                           (1 Point)  [ ] BMI >40, smoking, diabetes requiring insulin, chemotherapy  blood transfusions and length of surgery over 2 hours    SURGERY RELATED RISK FACTORS  [ ]  Section within the last month     (1 Point)  [ ] Minor surgery                                                  (1 Point)  [ ] Arthroscopic surgery                                       (2 Points)  x[ ] Planned major surgery lasting more            (2 Points)      than 45 minutes     [ ] Elective hip or knee joint replacement       (5 points)       surgery                                                TRAUMA RELATED RISK FACTORS  [ ] Fracture of the hip, pelvis, or leg                       (5 Points)  [ ] Spinal cord injury resulting in paralysis             (5 points)       (in the previous month)    [ ] Paralysis  (less than 1 month)                             (5 Points)  [ ] Multiple Trauma within 1 month                        (5 Points)    Total Score [    2    ]    Caprini Score 0-2: Low Risk, NO VTE prophylaxis required for most patients, encourage ambulation  Caprini Score 3-6: Moderate Risk , pharmacologic VTE prophylaxis is indicated for most patients (in the absence of contraindications)  Caprini Score Greater than or =7: High risk, pharmocologic VTE prophylaxis indicated for most patients (in the absence of contraindications)    OPIOID RISK TOOL    MEDINA EACH BOX THAT APPLIES AND ADD TOTALS AT THE END    FAMILY HISTORY OF SUBSTANCE ABUSE                 FEMALE         MALE                                                Alcohol                             [  ]1 pt          [  ]3pts                                               Illegal Durgs                     [  ]2 pts        [  ]3pts                                               Rx Drugs                           [  ]4 pts        [  ]4 pts    PERSONAL HISTORY OF SUBSTANCE ABUSE                                                                                          Alcohol                             [  ]3 pts       [  ]3 pts                                               Illegal Drugs                     [  ]4 pts        [  ]4 pts                                               Rx Drugs                           [  ]5 pts        [  ]5 pts    AGE BETWEEN 16-45 YEARS                                      [x  ]1 pt         [  ]1 pt    HISTORY OF PREADOLESCENT   SEXUAL ABUSE                                                             [  ]3 pts        [  ]0pts    PSYCHOLOGICAL DISEASE                     ADD, OCD, Bipolar, Schizophrenia        [  ]2 pts         [  ]2 pts                      Depression                                               [  ]1 pt           [  ]1 pt           SCORING TOTAL   (add numbers and type here)              (*1**)                                     A score of 3 or lower indicated LOW risk for future opioid abuse  A score of 4 to 7 indicated moderate risk for future opioid abuse  A score of 8 or higher indicates a high risk for opioid abuse

## 2025-04-01 NOTE — H&P PST ADULT - RESPIRATORY
normal/clear to auscultation bilaterally/no wheezes/no rales/no rhonchi/no use of accessory muscles/airway patent/breath sounds equal/good air movement

## 2025-04-01 NOTE — H&P PST ADULT - GASTROINTESTINAL
normal/soft/nontender/nondistended/normal active bowel sounds/no guarding/no rigidity/no organomegaly/no palpable catrachita/no masses palpable negative

## 2025-04-01 NOTE — H&P PST ADULT - HISTORY OF PRESENT ILLNESS
24-year-old female presents for PST with PMH of anemia requiring blood transfusions in past. Patient c/o discomfort under her right scapula. She is right-hand dominant. She states the lesion appeared spontaneously a few years ago and has gotten bigger. Her primary care provider recommended that she see Dr. Ivan for excision because she is having daily constant pain at that area which is exacerbated when she is particularly active. She is becoming an  and noticed that positioning is particularly painful for her. Before her career becomes busy and because of her pain levels which is constant, she would like this growing mass excised at this time. She is not having any tingling or numbness of the arms or legs, but there is a burning pain which is constant intermittently rates 9 out of 10 about the lesion. There is no change in bowel or bladder. She does home exercise and stretching on a daily basis as directed by primary care provider for the last 6 months particularly for neck back and upper extremity pain without any resolution of this pain. Patient scheduled for removal of lipoma right torso on 4/7/2025 with Dr. Ivan.  24-year-old female presents for PST with PMH of anemia requiring blood transfusions in past last transfusion 1/2025. Patient c/o discomfort under her right scapula. She is right-hand dominant. She states the lesion appeared spontaneously a few years ago and has gotten bigger. Her primary care provider recommended that she see Dr. Ivan for excision because she is having daily constant pain at that area which is exacerbated when she is particularly active. She is becoming an  and noticed that positioning is particularly painful for her. Before her career becomes busy and because of her pain levels which is constant, she would like this growing mass excised at this time. She is not having any tingling or numbness of the arms or legs, but there is a sharp pain which is intermittent rates 2-6 out of 10 about the lesion.There is no change in bowel or bladder. She does home exercise and stretching on a daily basis as directed by primary care provider for the last 6 months particularly for neck back and upper extremity pain without any resolution of this pain. Patient scheduled for removal of lipoma right torso on 4/7/2025 with Dr. Ivan.

## 2025-04-01 NOTE — H&P PST ADULT - ATTENDING COMMENTS
Attending statement:  I have personally seen this patient, and formed a face to face diagnostic evaluation on this patient on this date.  I have reviewed the PA, NP and or Medical/PA student and/or Resident documentation and agree with the history, physical exam and plan of care except if noted otherwise.      Very pleasant woman presents for a right periscapular lipoma removal.  Patient understands she is violet be exchanging a soft mobile subcu mass for an incision.  Transverse incision will be utilized this will be sent/to lipoma or suspected lipoma will be sent for pathology.  Risks benefits questions comments concerns been discussed including postop seroma patient wishes to proceed secondary to her persistent pain and discomfort.

## 2025-04-01 NOTE — PROVIDER CONTACT NOTE (OTHER) - ACTION/TREATMENT ORDERED:
emailed video of preop spine education to pt on 4/1, followed with Telephone review of program, all questions answered. Contact information given

## 2025-04-02 PROBLEM — D64.9 ANEMIA, UNSPECIFIED: Chronic | Status: ACTIVE | Noted: 2025-04-01

## 2025-04-02 RX ORDER — POVIDONE-IODINE 7.5 %
1 SOLUTION, NON-ORAL TOPICAL ONCE
Refills: 0 | Status: DISCONTINUED | OUTPATIENT
Start: 2025-04-07 | End: 2025-04-07

## 2025-04-04 LAB — HCG SERPL-MCNC: 54 MIU/ML

## 2025-04-07 ENCOUNTER — OUTPATIENT (OUTPATIENT)
Dept: INPATIENT UNIT | Facility: HOSPITAL | Age: 25
LOS: 1 days | End: 2025-04-07
Payer: MEDICAID

## 2025-04-07 ENCOUNTER — TRANSCRIPTION ENCOUNTER (OUTPATIENT)
Age: 25
End: 2025-04-07

## 2025-04-07 ENCOUNTER — APPOINTMENT (OUTPATIENT)
Dept: ORTHOPEDIC SURGERY | Facility: HOSPITAL | Age: 25
End: 2025-04-07

## 2025-04-07 VITALS
OXYGEN SATURATION: 100 % | TEMPERATURE: 98 F | HEART RATE: 69 BPM | DIASTOLIC BLOOD PRESSURE: 70 MMHG | RESPIRATION RATE: 18 BRPM | SYSTOLIC BLOOD PRESSURE: 113 MMHG

## 2025-04-07 VITALS
SYSTOLIC BLOOD PRESSURE: 126 MMHG | HEIGHT: 60.98 IN | WEIGHT: 158.73 LBS | RESPIRATION RATE: 16 BRPM | DIASTOLIC BLOOD PRESSURE: 82 MMHG | OXYGEN SATURATION: 100 % | TEMPERATURE: 97 F | HEART RATE: 69 BPM

## 2025-04-07 DIAGNOSIS — Z90.3 ACQUIRED ABSENCE OF STOMACH [PART OF]: Chronic | ICD-10-CM

## 2025-04-07 DIAGNOSIS — Z01.818 ENCOUNTER FOR OTHER PREPROCEDURAL EXAMINATION: ICD-10-CM

## 2025-04-07 DIAGNOSIS — D17.1 BENIGN LIPOMATOUS NEOPLASM OF SKIN AND SUBCUTANEOUS TISSUE OF TRUNK: ICD-10-CM

## 2025-04-07 LAB
BLD GP AB SCN SERPL QL: SIGNIFICANT CHANGE UP
HCG SERPL-ACNC: 23 MIU/ML — HIGH

## 2025-04-07 PROCEDURE — 21933 EXC BACK TUM DEEP 5 CM/>: CPT | Mod: AS

## 2025-04-07 PROCEDURE — 21933 EXC BACK TUM DEEP 5 CM/>: CPT

## 2025-04-07 DEVICE — SURGIFLO MATRIX WITH THROMBIN KIT: Type: IMPLANTABLE DEVICE | Site: RIGHT | Status: FUNCTIONAL

## 2025-04-07 DEVICE — AGENT HEMOSTAT COLLAGEN AVITENE ULTRA 8X12.5CM: Type: IMPLANTABLE DEVICE | Site: RIGHT | Status: FUNCTIONAL

## 2025-04-07 RX ORDER — IBUPROFEN 200 MG
1 TABLET ORAL
Refills: 0 | DISCHARGE

## 2025-04-07 RX ORDER — METHOCARBAMOL 500 MG/1
2 TABLET, FILM COATED ORAL
Qty: 42 | Refills: 0
Start: 2025-04-07 | End: 2025-04-13

## 2025-04-07 RX ORDER — CEPHALEXIN 250 MG/1
1 CAPSULE ORAL
Qty: 4 | Refills: 0
Start: 2025-04-07 | End: 2025-04-07

## 2025-04-07 RX ORDER — TRAMADOL HYDROCHLORIDE AND ACETAMINOPHEN 37.5; 325 MG/1; MG/1
0 TABLET ORAL
Refills: 0 | DISCHARGE

## 2025-04-07 RX ORDER — OXYCODONE HYDROCHLORIDE 30 MG/1
1 TABLET ORAL
Qty: 21 | Refills: 0
Start: 2025-04-07 | End: 2025-04-13

## 2025-04-07 RX ORDER — HYDROMORPHONE/SOD CHLOR,ISO/PF 2 MG/10 ML
0.5 SYRINGE (ML) INJECTION
Refills: 0 | Status: DISCONTINUED | OUTPATIENT
Start: 2025-04-07 | End: 2025-04-07

## 2025-04-07 RX ORDER — ACETAMINOPHEN 500 MG/5ML
975 LIQUID (ML) ORAL ONCE
Refills: 0 | Status: COMPLETED | OUTPATIENT
Start: 2025-04-07 | End: 2025-04-07

## 2025-04-07 RX ORDER — CEFAZOLIN SODIUM IN 0.9 % NACL 3 G/100 ML
2000 INTRAVENOUS SOLUTION, PIGGYBACK (ML) INTRAVENOUS ONCE
Refills: 0 | Status: DISCONTINUED | OUTPATIENT
Start: 2025-04-07 | End: 2025-04-07

## 2025-04-07 RX ORDER — APREPITANT 40 MG/1
40 CAPSULE ORAL ONCE
Refills: 0 | Status: COMPLETED | OUTPATIENT
Start: 2025-04-07 | End: 2025-04-07

## 2025-04-07 RX ORDER — ONDANSETRON HCL/PF 4 MG/2 ML
4 VIAL (ML) INJECTION ONCE
Refills: 0 | Status: COMPLETED | OUTPATIENT
Start: 2025-04-07 | End: 2025-04-07

## 2025-04-07 RX ORDER — SODIUM CHLORIDE 9 G/1000ML
1000 INJECTION, SOLUTION INTRAVENOUS
Refills: 0 | Status: DISCONTINUED | OUTPATIENT
Start: 2025-04-07 | End: 2025-04-07

## 2025-04-07 RX ORDER — CELECOXIB 50 MG/1
200 CAPSULE ORAL ONCE
Refills: 0 | Status: COMPLETED | OUTPATIENT
Start: 2025-04-07 | End: 2025-04-07

## 2025-04-07 RX ADMIN — CELECOXIB 200 MILLIGRAM(S): 50 CAPSULE ORAL at 06:50

## 2025-04-07 RX ADMIN — APREPITANT 40 MILLIGRAM(S): 40 CAPSULE ORAL at 06:50

## 2025-04-07 RX ADMIN — Medication 975 MILLIGRAM(S): at 06:50

## 2025-04-07 RX ADMIN — Medication 4 MILLIGRAM(S): at 10:40

## 2025-04-07 NOTE — ASU DISCHARGE PLAN (ADULT/PEDIATRIC) - COMMENTS
Your post op appointment is already scheduled for 4/17/25.  If you have any issues prior to that time, call Dr Ivan's office at [phone].  Leave your bandage intact unless it becomes soiled or moist, at which time you can remove it and cover with a similar dressing.  Steri strips will fall off on their own.  You can use ice and or heat as needed for pain.

## 2025-04-07 NOTE — ASU DISCHARGE PLAN (ADULT/PEDIATRIC) - MEDICATION INSTRUCTIONS
take tylenol 1000 mg every 6-8 hours for mild to moderate pain, ibuprofen 600 mg every 8 hours as needed for severe pain.  oxycodone 5 mg every 8 hours for breakthrough pain not controlled by tylenol and ibuprofen.  methocarbamol 500 mg every 8 hours as needed for muscle spasm.  keflex 500 mg every 6 hours x 1 day to prevent superficial skin infection.

## 2025-04-07 NOTE — ASU DISCHARGE PLAN (ADULT/PEDIATRIC) - CARE PROVIDER_API CALL
Rboer Ivan  Spine Surgery  [address] [place] [id number]  Phone: [phone]  Fax: [phone]  Follow Up Time:

## 2025-04-07 NOTE — ASU DISCHARGE PLAN (ADULT/PEDIATRIC) - FINANCIAL ASSISTANCE
[facility] provides services at a reduced cost to those who are determined to be eligible through [facility]’s financial assistance program. Information regarding [facility]’s financial assistance program can be found by going to [email or web address] or by calling [phone].

## 2025-04-07 NOTE — BRIEF OPERATIVE NOTE - OPERATION/FINDINGS
Lipoma measuring  3 cm X  10 cm
Thoracic lipoma site on the right was cleansed with Betadine scrub brush then DuraPrep was then utilized for definitive prepped by the R.N. I assisted with placement of blue drapes, Ioban. I participated in operative time out.  Skin incision was then made and dissection and removal of lipoma was performed. I assisted with visualization of the operative area by utilizing sequential retraction in the form of wheatlaner retractor, [employer] and pickups, by using persistent suction, intermittent irrigation, and providing hemostasis with FloSeal, Gelfoam and bovie cautery when appropriate.  After lipoma was removed, copious irrigation was utilized, hemostasis was accomplished.  I personally performed closure with 0 Vicryl to the deep fascia, 2-0 Vicryl for superficial fascia, Monocryl for skin, Dermabond a longitudinal steri strips. Dry sterile mepelex dressing placed.

## 2025-04-07 NOTE — BRIEF OPERATIVE NOTE - NSICDXBRIEFPREOP_GEN_ALL_CORE_FT
PRE-OP DIAGNOSIS:  Lipoma of back 07-Apr-2025 08:43:50  Rober Ivan  
PRE-OP DIAGNOSIS:  Lipoma of back 07-Apr-2025 08:43:50  Rober Ivan

## 2025-04-07 NOTE — ASU DISCHARGE PLAN (ADULT/PEDIATRIC) - NS MD DC FALL RISK RISK
For information on Fall & Injury Prevention, visit: [email or web address] OR  [email or web address] OR  https://www.cdc.gov/steadi/patient.html

## 2025-04-07 NOTE — BRIEF OPERATIVE NOTE - NSICDXBRIEFPOSTOP_GEN_ALL_CORE_FT
POST-OP DIAGNOSIS:  Lipoma of back 07-Apr-2025 08:44:01  Rober Ivan  
POST-OP DIAGNOSIS:  Lipoma of back 07-Apr-2025 08:44:01  Rober Ivan

## 2025-04-07 NOTE — BRIEF OPERATIVE NOTE - NSICDXBRIEFPROCEDURE_GEN_ALL_CORE_FT
PROCEDURES:  Excision of lipoma of scapula 07-Apr-2025 08:43:29  Rober Ivan  
PROCEDURES:  Excision of lipoma of scapula 07-Apr-2025 08:43:29  Rober Ivan

## 2025-04-08 ENCOUNTER — RESULT REVIEW (OUTPATIENT)
Age: 25
End: 2025-04-08

## 2025-04-08 PROCEDURE — 86901 BLOOD TYPING SEROLOGIC RH(D): CPT

## 2025-04-08 PROCEDURE — 21552 EXC NECK LES SC 3 CM/>: CPT

## 2025-04-08 PROCEDURE — 86850 RBC ANTIBODY SCREEN: CPT

## 2025-04-08 PROCEDURE — 36415 COLL VENOUS BLD VENIPUNCTURE: CPT

## 2025-04-08 PROCEDURE — C1889: CPT

## 2025-04-08 PROCEDURE — C9399: CPT

## 2025-04-08 PROCEDURE — 88304 TISSUE EXAM BY PATHOLOGIST: CPT | Mod: 26

## 2025-04-08 PROCEDURE — 84702 CHORIONIC GONADOTROPIN TEST: CPT

## 2025-04-08 PROCEDURE — 88304 TISSUE EXAM BY PATHOLOGIST: CPT

## 2025-04-08 PROCEDURE — 86900 BLOOD TYPING SEROLOGIC ABO: CPT

## 2025-04-11 LAB — SURGICAL PATHOLOGY STUDY: SIGNIFICANT CHANGE UP

## 2025-04-14 RX ORDER — METHYLPREDNISOLONE 4 MG/1
4 TABLET ORAL
Qty: 1 | Refills: 1 | Status: ACTIVE | COMMUNITY
Start: 2025-04-14 | End: 1900-01-01

## 2025-04-14 RX ORDER — LORATADINE 10 MG/1
10 TABLET ORAL DAILY
Qty: 30 | Refills: 0 | Status: ACTIVE | COMMUNITY
Start: 2025-04-14 | End: 1900-01-01

## 2025-04-15 ENCOUNTER — APPOINTMENT (OUTPATIENT)
Dept: ORTHOPEDIC SURGERY | Facility: CLINIC | Age: 25
End: 2025-04-15
Payer: MEDICAID

## 2025-04-15 DIAGNOSIS — L23.9 ALLERGIC CONTACT DERMATITIS, UNSPECIFIED CAUSE: ICD-10-CM

## 2025-04-15 DIAGNOSIS — D17.1 BENIGN LIPOMATOUS NEOPLASM OF SKIN AND SUBCUTANEOUS TISSUE OF TRUNK: ICD-10-CM

## 2025-04-15 PROCEDURE — 99024 POSTOP FOLLOW-UP VISIT: CPT

## 2025-04-15 PROCEDURE — 99213 OFFICE O/P EST LOW 20 MIN: CPT | Mod: 24

## 2025-06-17 PROBLEM — Z01.818 PRE-OP TESTING: Status: ACTIVE | Noted: 2025-06-17 | Resolved: 2025-08-16

## 2025-09-15 ENCOUNTER — APPOINTMENT (OUTPATIENT)
Dept: FAMILY MEDICINE | Facility: CLINIC | Age: 25
End: 2025-09-15

## (undated) DEVICE — DRAPE TOWEL BLUE 17" X 24"

## (undated) DEVICE — PACK MINOR WITH LAP

## (undated) DEVICE — GLV 7.5 SENSICARE W ALOE

## (undated) DEVICE — SOL IRR POUR H2O 1000ML

## (undated) DEVICE — DRSG MASTISOL

## (undated) DEVICE — SOL IRR POUR NS 0.9% 1000ML

## (undated) DEVICE — DRAPE 1/2 SHEET 40X57"

## (undated) DEVICE — WARMING BLANKET UPPER ADULT

## (undated) DEVICE — ELCTR BOVIE PENCIL SMOKE EVACUATION

## (undated) DEVICE — VENODYNE/SCD SLEEVE CALF MEDIUM

## (undated) DEVICE — PREP CHLORAPREP HI-LITE ORANGE 26ML

## (undated) DEVICE — ELCTR GROUNDING PAD ADULT COVIDIEN

## (undated) DEVICE — WARMING BLANKET LOWER ADULT